# Patient Record
Sex: FEMALE | Race: OTHER | Employment: STUDENT | URBAN - METROPOLITAN AREA
[De-identification: names, ages, dates, MRNs, and addresses within clinical notes are randomized per-mention and may not be internally consistent; named-entity substitution may affect disease eponyms.]

---
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
## 2018-01-07 RX ORDER — AZATHIOPRINE 50 MG/1
TABLET ORAL
Qty: 90 TAB | Refills: 0 | Status: SHIPPED | OUTPATIENT
Start: 2018-01-07 | End: 2018-01-24 | Stop reason: ALTCHOICE

## 2018-01-24 ENCOUNTER — OFFICE VISIT (OUTPATIENT)
Dept: RHEUMATOLOGY | Age: 18
End: 2018-01-24

## 2018-01-24 VITALS
BODY MASS INDEX: 32.83 KG/M2 | HEART RATE: 111 BPM | OXYGEN SATURATION: 98 % | TEMPERATURE: 98.7 F | RESPIRATION RATE: 16 BRPM | DIASTOLIC BLOOD PRESSURE: 92 MMHG | SYSTOLIC BLOOD PRESSURE: 155 MMHG | WEIGHT: 203.4 LBS

## 2018-01-24 DIAGNOSIS — E07.9 THYROID DISEASE: ICD-10-CM

## 2018-01-24 DIAGNOSIS — L93.1 SUBACUTE CUTANEOUS LUPUS ERYTHEMATOSUS: Primary | ICD-10-CM

## 2018-01-24 DIAGNOSIS — I10 HYPERTENSION, UNSPECIFIED TYPE: ICD-10-CM

## 2018-01-24 RX ORDER — HYDROXYCHLOROQUINE SULFATE 200 MG/1
300 TABLET, FILM COATED ORAL DAILY
Qty: 45 TAB | Refills: 6 | Status: SHIPPED | OUTPATIENT
Start: 2018-01-24 | End: 2018-08-08 | Stop reason: SDUPTHER

## 2018-01-24 RX ORDER — AZATHIOPRINE 50 MG/1
150 TABLET ORAL DAILY
Qty: 90 TAB | Refills: 6 | Status: SHIPPED | OUTPATIENT
Start: 2018-01-24 | End: 2018-08-08 | Stop reason: SDUPTHER

## 2018-01-24 NOTE — MR AVS SNAPSHOT
[address] 
[phone] Patient: Moody Castaneda MRN: [id number] IMW:[birth year] Visit Information Date & Time Provider Department Dept. Phone Encounter #  
 1/24/2018  9:00 AM Maya Muir MD [address] [phone] [id number] Follow-up Instructions Return in about 3 months (around 4/24/2018). Upcoming Health Maintenance Date Due Hepatitis A Peds Age 1-18 (1 of 2 - Standard Series) 10/5/1999 DTaP/Tdap/Td series (1 - Tdap) 10/5/2005 HPV AGE 9Y-26Y (1 of 3 - Female 3 Dose Series) 10/5/2009 Influenza Age 5 to Adult 8/1/2017 Allergies as of 1/24/2018  Review Complete On: 1/24/2018 By: Fermin Gonzalez LPN Severity Noted Reaction Type Reactions Benadryl-d Allergy-sinus [Diphenhydramine-phenylephrine]  07/01/2015    Anxiety Contrast Agent [Iodine]  07/01/2015    Other (comments) Stuffy  Nose unable to breath Reglan [Metoclopramide]  07/07/2015    Other (comments) Current Immunizations  Never Reviewed No immunizations on file. Not reviewed this visit You Were Diagnosed With   
  
 Codes Comments Subacute cutaneous lupus erythematosus    -  Primary ICD-10-CM: L93.1 ICD-9-CM: 695.4 Hypertension, unspecified type     ICD-10-CM: I10 
ICD-9-CM: 401.9 Thyroid disease     ICD-10-CM: E07.9 ICD-9-CM: 246. 9 Vitals BP Pulse Temp Resp Weight(growth percentile) LMP  
 (!) 155/92 (>99 %/ >99 %)* (BP 1 Location: Right arm, BP Patient Position: Sitting) (!) 111 98.7 °F (37.1 °C) (Oral) 16 203 lb 6.4 oz (92.3 kg) (98 %, Z= 2.00) 01/03/2018 (Approximate) SpO2 Breastfeeding? BMI OB Status Smoking Status 98% No 32.83 kg/m2 (96 %, Z= 1.78) Pregnant Never Smoker *BP percentiles are based on NHBPEP's 4th Report Growth percentiles are based on CDC 2-20 Years data. BMI and BSA Data  Body Mass Index Body Surface Area  
 32.83 kg/m 2 2.07 m 2 Preferred Pharmacy Pharmacy Name Phone 310 [facility], [facility] [address] ([address] [phone] Your Updated Medication List  
  
   
This list is accurate as of: 1/24/18  9:24 AM.  Always use your most recent med list.  
  
  
  
  
 * PROAIR HFA 90 mcg/actuation inhaler Generic drug:  albuterol Take  by inhalation. * albuterol 2.5 mg /3 mL (0.083 %) nebulizer solution Commonly known as:  PROVENTIL VENTOLIN  
INHALE 1 VIAL VIA NEBULIZER Q 4 TO 6 H PRN  
  
 * azaTHIOprine 50 mg tablet Commonly known as:  The Pepsi Take 50 mg by mouth daily. Indications: takes (3) tablets * azaTHIOprine 50 mg tablet Commonly known as:  The Pepsi Take 3 Tabs by mouth daily for 30 days. ergocalciferol 50,000 unit capsule Commonly known as:  ERGOCALCIFEROL TK ONE C PO  Q WEEK FOR 90 DAYS  
  
 ethosuximide 250 mg/5 mL solution Commonly known as:  Jennie Gambles Take 20 mL by mouth nightly. * hydroxychloroquine 200 mg tablet Commonly known as:  PLAQUENIL Take 200 mg by mouth daily. * hydroxychloroquine 200 mg tablet Commonly known as:  PLAQUENIL Take 1.5 Tabs by mouth daily for 30 days. medroxyPROGESTERone 150 mg/mL injection Commonly known as:  DEPO-PROVERA INJECT 1ML INTRAMUSCULAR Q 3 MONTHS * Notice: This list has 6 medication(s) that are the same as other medications prescribed for you. Read the directions carefully, and ask your doctor or other care provider to review them with you. Prescriptions Sent to Pharmacy Refills  
 azaTHIOprine (IMURAN) 50 mg tablet 6 Sig: Take 3 Tabs by mouth daily for 30 days. Class: Normal  
 [facility] [address] [place] [address] #: [phone]  Route: Oral  
 hydroxychloroquine (PLAQUENIL) 200 mg tablet 6 Sig: Take 1.5 Tabs by mouth daily for 30 days. Class: Normal  
 Pharm[facility] [address] [place] [address] #: [phone] Route: Oral  
  
We Performed the Following C REACTIVE PROTEIN, QT [52459 CPT(R)] CBC+PLATELET+HEM REVIEW [21486 CPT(R)] COMPLEMENT, C3 [id number] CPT(R)] COMPLEMENT, C4 [id number] CPT(R)] CREATININE, UR, RANDOM [id number] CPT(R)] DSDNA (NDNA) SCRN BY JAJA [[id number] Custom] METABOLIC PANEL, COMPREHENSIVE [59406 CPT(R)] Francesca Sheth [[id number] Custom] SED RATE (ESR) [id number] CPT(R)] TSH 3RD GENERATION [20596 CPT(R)] UA/M W/RFLX CULTURE, ROUTINE [[id number] Custom] Follow-up Instructions Return in about 3 months (around 4/24/2018). Introducing [facility]! [employer] introduces STRATUSCORE patient portal. Now you can access parts of your medical record, email your doctor's office, and request medication refills online. 1. In your internet browser, go to [email or web address] 2. Click on the First Time User? Click Here link in the Sign In box. You will see the New Member Sign Up page. 3. Enter your STRATUSCORE Access Code exactly as it appears below. You will not need to use this code after youve completed the sign-up process. If you do not sign up before the expiration date, you must request a new code. · STRATUS[employer] Access Code: [email or web address] Expires: 4/24/2018  9:23 AM 
 
4. Enter the last four digits of your Social Security Number (xxxx) and Date of Birth (mm/dd/yyyy) as indicated and click Submit. You will be taken to the next sign-up page. 5. Create a STRATUSCORE ID. This will be your STRATUSCORE login ID and cannot be changed, so think of one that is secure and easy to remember. 6. Create a STRATUSCORE password. You can change your password at any time. 7. Enter your Password Reset Question and Answer.  This can be used at a later time if you forget your password. 8. Enter your e-mail address. You will receive e-mail notification when new information is available in [address] 9. Click Sign Up. You can now view and download portions of your medical record. 10. Click the Download Summary menu link to download a portable copy of your medical information. If you have questions, please visit the Frequently Asked Questions section of the [employer]inder website. Remember, [employer]inder is NOT to be used for urgent needs. For medical emergencies, dial 911. Now available from your iPhone and Android! Please provide this summary of care documentation to your next provider. Your primary care clinician is listed as Phys Other. If you have any questions after today's visit, please call [phone].

## 2018-01-24 NOTE — PROGRESS NOTES
RHEUMATOLOGY PROBLEM LIST AND CHIEF COMPLAINT  1. 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  female. Today, the patient complains of no pain in the joints. Location: NA  Severity:  0 on a scale of 0-10  Timing: all day   Duration:  4 months  Modifying factors: Recent Birth  Context/Associated signs and symptoms: The patient recently gave birth with no complications. She has no joint complaints and denies rashes or fevers. She is no longer taking prednisone. She continues with Plaquenil 300 mg daily, and Imuran 150 mg daily. She has Depo IM and complains of nausea and weight gain and bleeding. She continues with ethosuxomide and denies any recent seizures. RHEUMATOLOGY REVIEW OF SYSTEMS   Positives as per history  Negatives as follows:  Tania GuadalupeBryanna:  Denies unexplained persistent fevers or weight change  RESPIRATORY:  No pleuritic pain, exertional dyspnea  CARDIOVASCULAR:  Denies chest pain  GASTRO:   Denies heartburn, abdominal pain, nausea, vomiting, diarrhea  SKIN:    Denies rash  MSK:    No morning stiffness >1 hour    PAST MEDICAL HISTORY  Reviewed with patient, significant changes in medical history - No    PHYSICAL EXAM  Blood pressure (!) 155/92, pulse (!) 111, temperature 98.7 °F (37.1 °C), temperature source Oral, resp. rate 16, weight 203 lb 6.4 oz (92.3 kg), last menstrual period 01/03/2018, SpO2 98 %, not currently breastfeeding. GENERAL APPEARANCE: Well-nourished, no acute distress  NECK: No adenopathy  ENT: No oral ulcers  CARDIOVASCULAR: Heart rhythm is regular.  No murmur, rub, gallop  CHEST: Normal vesicular breath sounds. No wheezes, rales, pleural friction rubs  ABDOMINAL: The abdomen is soft and nontender. Bowel sounds are normal.  SKIN: No rash, palpable purpura, digital ulcer, abnormal thickening   MUSCULOSKELETAL: Generalized hypermobility  Upper extremities - full range of motion, no tenderness, no swelling, no synovial thickening and no deformity of joints  Lower extremities - full range of motion, no tenderness, no swelling, no synovial thickening and no deformity of joints     LABS, RADIOLOGY AND PROCEDURES   Previous labs reviewed -Yes    Labs 8/27/2017  Proteinuria - trace    ASSESSMENT  1. Lupus (Established problem -  Progressive disease) - The patient's lupus is well-managed by Imuran 150 mg daily and Plaquenil 300 mg daily. She is asymptomatic on exam. She continues to have high blood pressure so we will continue to monitor this. She should continue with her medication and return in 3 months for a follow up. I will check labs today. 2. Hypermobility - (Established problem -  Stable disease)    3. Drug therapy monitoring for toxicity (azathioprine) - CBC, BUN, Cr, AST, ALT and albumin every 3 months. 4. Drug therapy monitoring for toxicity (plaquenil) - CBC, BUN, Cr, AST, ALT and albumin every 3 months; eye exams every 6-12 months for retinal toxicity. PLAN  1. Imuran 150 mg daily  2. Plaquenil 300 mg daily. 3. Lupus disease activity labs - CBC, CMP, ESR, CRP, DSDNDA, complements, UA, urine protein/creatinine   4. Return in 4 months    Savannah Metz MD  Adult and Pediatric Rheumatology     [facility] [address], Phone [phone], Fax [phone]     Visiting  of Pediatrics    Department of Pediatrics, [facility] of [address] [place] [address], [address] [place] [address], Phone [phone], Fax [phone]    There are no Patient Instructions on file for this visit. cc:  Amrita Page (PCP)  Dr. Cirilo Grove (OBGYN)  Dr. Leonor Stallworth (High-risk OB)    Written by andrey King, as dictated by Joaquin Krueger.  Jah Daley M.D.

## 2018-01-29 LAB
ALBUMIN SERPL-MCNC: 4.3 G/DL (ref 3.5–5.5)
ALBUMIN/GLOB SERPL: 1.4 {RATIO} (ref 1.2–2.2)
ALP SERPL-CCNC: 62 IU/L (ref 39–117)
ALT SERPL-CCNC: 6 IU/L (ref 0–32)
AST SERPL-CCNC: 13 IU/L (ref 0–40)
BASOPHILS # BLD MANUAL: 0 X10E3/UL (ref 0–0.2)
BASOPHILS NFR BLD MANUAL: 0 %
BILIRUB SERPL-MCNC: <0.2 MG/DL (ref 0–1.2)
BUN SERPL-MCNC: 12 MG/DL (ref 6–20)
BUN/CREAT SERPL: 20 (ref 9–23)
C3 SERPL-MCNC: 100 MG/DL (ref 82–167)
C4 SERPL-MCNC: 9 MG/DL (ref 14–44)
CALCIUM SERPL-MCNC: 8.8 MG/DL (ref 8.7–10.2)
CHLORIDE SERPL-SCNC: 105 MMOL/L (ref 96–106)
CO2 SERPL-SCNC: 19 MMOL/L (ref 18–29)
CREAT SERPL-MCNC: 0.61 MG/DL (ref 0.57–1)
CRP SERPL-MCNC: 19.8 MG/L (ref 0–4.9)
DIFFERENTIAL COMMENT, 115260: ABNORMAL
DSDNA (NDNA) SCRN BY CRITHIDIA: NORMAL TITER
EOSINOPHIL # BLD MANUAL: 0.1 X10E3/UL (ref 0–0.4)
EOSINOPHIL NFR BLD MANUAL: 3 %
ERYTHROCYTE [DISTWIDTH] IN BLOOD BY AUTOMATED COUNT: 15.5 % (ref 12.3–15.4)
ERYTHROCYTE [SEDIMENTATION RATE] IN BLOOD BY WESTERGREN METHOD: 22 MM/HR (ref 0–32)
GFR SERPLBLD CREATININE-BSD FMLA CKD-EPI: 132 ML/MIN/1.73
GFR SERPLBLD CREATININE-BSD FMLA CKD-EPI: 152 ML/MIN/1.73
GLOBULIN SER CALC-MCNC: 3.1 G/DL (ref 1.5–4.5)
GLUCOSE SERPL-MCNC: 102 MG/DL (ref 65–99)
HCT VFR BLD AUTO: 32 % (ref 34–46.6)
HGB BLD-MCNC: 10.3 G/DL (ref 11.1–15.9)
LYMPHOCYTES # BLD MANUAL: 1.4 X10E3/UL (ref 0.7–3.1)
LYMPHOCYTES NFR BLD MANUAL: 33 %
MCH RBC QN AUTO: 26.3 PG (ref 26.6–33)
MCHC RBC AUTO-ENTMCNC: 32.2 G/DL (ref 31.5–35.7)
MCV RBC AUTO: 82 FL (ref 79–97)
MONOCYTES # BLD MANUAL: 0.1 X10E3/UL (ref 0.1–0.9)
MONOCYTES NFR BLD MANUAL: 3 %
NEUTROPHILS # BLD MANUAL: 2.6 X10E3/UL (ref 1.4–7)
NEUTROPHILS NFR BLD MANUAL: 61 %
PLATELET # BLD AUTO: 387 X10E3/UL (ref 150–379)
PLATELET BLD QL SMEAR: ABNORMAL
POTASSIUM SERPL-SCNC: 3.9 MMOL/L (ref 3.5–5.2)
PROT SERPL-MCNC: 7.4 G/DL (ref 6–8.5)
RBC # BLD AUTO: 3.92 X10E6/UL (ref 3.77–5.28)
RBC MORPH BLD: ABNORMAL
SODIUM SERPL-SCNC: 141 MMOL/L (ref 134–144)
TSH SERPL DL<=0.005 MIU/L-ACNC: 2.26 UIU/ML (ref 0.45–4.5)
WBC # BLD AUTO: 4.3 X10E3/UL (ref 3.4–10.8)

## 2018-02-01 LAB
CREAT UR-MCNC: NORMAL MG/DL
GLUCOSE UR QL: NORMAL
KETONES UR QL STRIP: NORMAL
PH UR STRIP: NORMAL [PH]
PROT UR QL STRIP: NORMAL
PROT UR-MCNC: NORMAL MG/DL
SP GR UR: NORMAL

## 2018-03-01 DIAGNOSIS — G89.4 CHRONIC PAIN SYNDROME: Primary | ICD-10-CM

## 2018-03-01 DIAGNOSIS — L93.1 SUBACUTE CUTANEOUS LUPUS ERYTHEMATOSUS: ICD-10-CM

## 2018-03-28 ENCOUNTER — OFFICE VISIT (OUTPATIENT)
Dept: RHEUMATOLOGY | Age: 18
End: 2018-03-28

## 2018-03-28 VITALS
WEIGHT: 215.6 LBS | DIASTOLIC BLOOD PRESSURE: 91 MMHG | TEMPERATURE: 98.5 F | HEIGHT: 66 IN | HEART RATE: 111 BPM | BODY MASS INDEX: 34.65 KG/M2 | SYSTOLIC BLOOD PRESSURE: 132 MMHG | OXYGEN SATURATION: 98 % | RESPIRATION RATE: 16 BRPM

## 2018-03-28 DIAGNOSIS — F51.01 PRIMARY INSOMNIA: ICD-10-CM

## 2018-03-28 DIAGNOSIS — J45.50 SEVERE PERSISTENT ASTHMA, UNSPECIFIED WHETHER COMPLICATED: Primary | ICD-10-CM

## 2018-03-28 RX ORDER — PREDNISONE 20 MG/1
TABLET ORAL
Refills: 0 | COMMUNITY
Start: 2018-03-19 | End: 2018-11-05 | Stop reason: ALTCHOICE

## 2018-03-28 NOTE — MR AVS SNAPSHOT
[address] Meghan Hoffman [address] 
[phone] Patient: Moody Castaneda MRN: [id number] XUJ:[birth year] Visit Information Date & Time Provider Department Dept. Phone Encounter #  
 3/28/2018  1:00 PM Maya Muir MD [address] [phone] [id number] Follow-up Instructions Return in about 2 months (around 5/28/2018). Upcoming Health Maintenance Date Due Hepatitis A Peds Age 1-18 (1 of 2 - Standard Series) 10/5/1999 DTaP/Tdap/Td series (1 - Tdap) 10/5/2005 HPV AGE 9Y-26Y (1 of 3 - Female 3 Dose Series) 10/5/2009 Influenza Age 5 to Adult 8/1/2017 Allergies as of 3/28/2018  Review Complete On: 3/28/2018 By: Maya Muir MD  
  
 Severity Noted Reaction Type Reactions Benadryl-d Allergy-sinus [Diphenhydramine-phenylephrine]  07/01/2015    Anxiety Contrast Agent [Iodine]  07/01/2015    Other (comments) Stuffy  Nose unable to breath Reglan [Metoclopramide]  07/07/2015    Other (comments) Current Immunizations  Never Reviewed No immunizations on file. Not reviewed this visit You Were Diagnosed With   
  
 Codes Comments Severe persistent asthma, unspecified whether complicated    -  Primary ICD-10-CM: J45.50 ICD-9-CM: 493.90 Primary insomnia     ICD-10-CM: F51.01 
ICD-9-CM: 307.42 Vitals BP Pulse Temp Resp Height(growth percentile) Weight(growth percentile) (!) 132/91 (98 %/ >99 %)* (BP 1 Location: Left arm, BP Patient Position: Sitting) (!) 111 98.5 °F (36.9 °C) (Oral) 16 5' 6\" (1.676 m) (75 %, Z= 0.67) 215 lb 9.6 oz (97.8 kg) (98 %, Z= 2.15) LMP SpO2 Breastfeeding? BMI OB Status Smoking Status 01/03/2018 (Approximate) 98% No 34.8 kg/m2 (97 %, Z= 1.90) Chemically Induced Never Smoker *BP percentiles are based on NHBPEP's 4th Report Growth percentiles are based on CDC 2-20 Years data. Vitals History BMI and BSA Data Body Mass Index Body Surface Area 34.8 kg/m 2 2.13 m 2 Preferred Pharmacy Pharmacy Name Phone [facility] [address] [phone] Your Updated Medication List  
  
   
This list is accurate as of 3/28/18  1:35 PM.  Always use your most recent med list.  
  
  
  
  
 * PROAIR HFA 90 mcg/actuation inhaler Generic drug:  albuterol Take  by inhalation. * albuterol 2.5 mg /3 mL (0.083 %) nebulizer solution Commonly known as:  PROVENTIL VENTOLIN  
INHALE 1 VIAL VIA NEBULIZER Q 4 TO 6 H PRN  
  
 azaTHIOprine 50 mg tablet Commonly known as:  The Pepsi Take 50 mg by mouth daily. Indications: takes (3) tablets  
  
 ergocalciferol 50,000 unit capsule Commonly known as:  ERGOCALCIFEROL TK ONE C PO  Q WEEK FOR 90 DAYS  
  
 ethosuximide 250 mg/5 mL solution Commonly known as:  Cummings Flake Take 20 mL by mouth nightly. hydroxychloroquine 200 mg tablet Commonly known as:  PLAQUENIL Take 200 mg by mouth daily. medroxyPROGESTERone 150 mg/mL injection Commonly known as:  DEPO-PROVERA INJECT 1ML INTRAMUSCULAR Q 3 MONTHS  
  
 predniSONE 20 mg tablet Commonly known as:  DELTASONE  
  
 * Notice: This list has 2 medication(s) that are the same as other medications prescribed for you. Read the directions carefully, and ask your doctor or other care provider to review them with you. We Performed the Following CREATININE, UR, RANDOM [id number] CPT(R)] Jason Wade [[id number] Custom] REFERRAL TO ALLERGY [REF5 Custom] Comments:  
 Dr. Obed Ceron - [phone] REFERRAL TO SLEEP STUDIES [REF99 Custom] Comments:  
 Sleep Study with Consult UA/M W/RFLX CULTURE, ROUTINE [[id number] Custom] Follow-up Instructions Return in about 2 months (around 5/28/2018). Referral Information Referral ID Referred By Referred To [id number] Sterling Wong Not Available Visits Status Start Date End Date 1 New Request 3/28/18 3/28/19 If your referral has a status of pending review or denied, additional information will be sent to support the outcome of this decision. Referral ID Referred By Referred To  
 [id number] Sterling Wong [address], [place] [address] Phone: [phone] Visits Status Start Date End Date 1 New Request 3/28/18 3/28/19 If your referral has a status of pending review or denied, additional information will be sent to support the outcome of this decision. Introducing [facility]! Dear Jani Almaguer: 
Thank you for requesting a Versus account. Our records indicate that you already have an active Versus account. You can access your account anytime at [email or web address]. [employer] Did you know that you can access your hospital and ER discharge instructions at any time in Versus? You can also review all of your test results from your hospital stay or ER visit. Additional Information If you have questions, please visit the Frequently Asked Questions section of the Versus website at [email or web address] [employer]/. Remember, Versus is NOT to be used for urgent needs. For medical emergencies, dial 911. Now available from your iPhone and Android! Please provide this summary of care documentation to your next provider. Your primary care clinician is listed as Phys Other. If you have any questions after today's visit, please call [phone].

## 2018-03-28 NOTE — PROGRESS NOTES
RHEUMATOLOGY PROBLEM LIST AND CHIEF COMPLAINT  1. 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  female. Today, the patient complains of no pain in the joints. Location: Hands  Severity:  0 on a scale of 0-10  Timing: all day   Duration:  2 months  Context/Associated signs and symptoms: The patient complains of recent intermittent hand swelling, brain fog, fatigue, and forgetfulness. She mentions a recent UTI and was put on antibiotics. She used steroids last week for asthma; she states she uses her rescue inhaler daily for this. She mentions improvement in fatigue with these steroids. She admits to poor sleep. She continues with Imuran 150 mg daily and Plaquenil 300 mg daily. RHEUMATOLOGY REVIEW OF SYSTEMS   Positives as per history  Negatives as follows:  Erum [place]:  Denies unexplained persistent fevers or weight change  RESPIRATORY:  No pleuritic pain, exertional dyspnea  CARDIOVASCULAR:  Denies chest pain  GASTRO:   Denies heartburn, abdominal pain, nausea, vomiting, diarrhea  SKIN:    Denies rash  MSK:    No morning stiffness >1 hour    PAST MEDICAL HISTORY  Reviewed with patient, significant changes in medical history - No    PHYSICAL EXAM  Blood pressure (!) 132/91, pulse (!) 111, temperature 98.5 °F (36.9 °C), temperature source Oral, resp. rate 16, height 5' 6\" (1.676 m), weight 215 lb 9.6 oz (97.8 kg), last menstrual period 01/03/2018, SpO2 98 %, not currently breastfeeding.   GENERAL APPEARANCE: Well-nourished, no acute distress  NECK: No adenopathy  ENT: No oral ulcers  CARDIOVASCULAR: Heart rhythm is regular. No murmur, rub, gallop  CHEST: Normal vesicular breath sounds. No wheezes, rales, pleural friction rubs  ABDOMINAL: The abdomen is soft and nontender. Bowel sounds are normal.  SKIN: No rash, palpable purpura, digital ulcer, abnormal thickening   MUSCULOSKELETAL: Generalized hypermobility  Upper extremities - full range of motion, no tenderness, no swelling, no synovial thickening and no deformity of joints  Lower extremities - full range of motion, no tenderness, no swelling, no synovial thickening and no deformity of joints     LABS, RADIOLOGY AND PROCEDURES   Previous labs reviewed -Yes    Labs 8/27/2017  Proteinuria - trace    ASSESSMENT  1. Lupus (Established problem -  Progressive disease) - the patient has multiple factors that can contribute to brain fog and fatigue so it is difficulty to determine if her lupus is the cause. We will rule out other causes before we escalate treatment. I will refer her to Allergy/Immunology for better management of her Asthma. I will order a sleep study. We will monitor her urine due to her recent UTI. For now, she should continue with Imuran 150 mg daily and Plaquenil 300 mg daily. She should return in 2 months for a follow up. 2. Hypermobility - (Established problem -  Stable disease)    3. Drug therapy monitoring for toxicity (azathioprine) - CBC, BUN, Cr, AST, ALT and albumin every 3 months. 4. Drug therapy monitoring for toxicity (plaquenil) - CBC, BUN, Cr, AST, ALT and albumin every 3 months; eye exams every 6-12 months for retinal toxicity. PLAN  1. Imuran 150 mg daily  2. Plaquenil 300 mg daily. 3. Check urine for protein and blood  4. Sleep Study  5. Referral to Allergy/Immunology - Zeus Madden  5. Return in 2 months    Savannah Vallejo MD  Adult and Pediatric Rheumatology     [address] [place] [address], [address] [place] [address], Phone [phone], Fax [phone]     Visiting Assistant Professor of Pediatrics    Department of Pediatrics, [employer] [facility] of [address] [place] [address], [address] [place] [address], Phone [phone], Fax [phone]    There are no Patient Instructions on file for this visit. cc:  Prakash Clifton (PCP)  Dr. Bhumi Moran (OBGYN)  Dr. Cathy Byers (High-risk OB)    Written by andrey Quarles, as dictated by Tacho Morris. Manoj Brooks M.D. Total face-to face time was 40 minutes, greater than 50% of which was spent in counseling and coordination of care. The diagnosis, treatment and various other items were discussed in detail: Test results, medication options, possible side effects, lifestyle changes.

## 2018-03-31 LAB
APPEARANCE UR: CLEAR
BACTERIA #/AREA URNS HPF: ABNORMAL /[HPF]
BACTERIA UR CULT: NORMAL
BILIRUB UR QL STRIP: NEGATIVE
CASTS URNS MICRO: ABNORMAL
CASTS URNS QL MICRO: PRESENT /LPF
COLOR UR: YELLOW
CREAT UR-MCNC: 93.2 MG/DL
CRYSTALS URNS MICRO: ABNORMAL
EPI CELLS #/AREA URNS HPF: ABNORMAL /HPF
GLUCOSE UR QL: NEGATIVE
HGB UR QL STRIP: NEGATIVE
KETONES UR QL STRIP: NEGATIVE
LEUKOCYTE ESTERASE UR QL STRIP: ABNORMAL
MICRO URNS: ABNORMAL
MUCOUS THREADS URNS QL MICRO: PRESENT
NITRITE UR QL STRIP: NEGATIVE
PH UR STRIP: 5.5 [PH] (ref 5–7.5)
PROT UR QL STRIP: ABNORMAL
PROT UR-MCNC: 38.2 MG/DL
RBC #/AREA URNS HPF: ABNORMAL /HPF
SP GR UR: 1.02 (ref 1–1.03)
UNIDENT CRYS URNS QL MICRO: PRESENT
URINALYSIS REFLEX, 377202: ABNORMAL
UROBILINOGEN UR STRIP-MCNC: 0.2 MG/DL (ref 0.2–1)
WBC #/AREA URNS HPF: ABNORMAL /HPF

## 2018-06-12 ENCOUNTER — TELEPHONE (OUTPATIENT)
Dept: RHEUMATOLOGY | Age: 18
End: 2018-06-12

## 2018-06-12 NOTE — TELEPHONE ENCOUNTER
----- Message from Sonja Johnson sent at 6/11/2018  3:07 PM EDT -----  Regarding: Dr. Gallo Andre / Telephone  Pt stated she did not receive any information regarding a lung doctor. Pt would like a phone call back with this information. Pt would also like to reschedule her missed appt from May 29, 2018.    Best contact: [phone]

## 2018-07-03 ENCOUNTER — OFFICE VISIT (OUTPATIENT)
Dept: RHEUMATOLOGY | Age: 18
End: 2018-07-03

## 2018-07-03 VITALS
HEART RATE: 103 BPM | DIASTOLIC BLOOD PRESSURE: 85 MMHG | RESPIRATION RATE: 16 BRPM | WEIGHT: 223.4 LBS | TEMPERATURE: 98.9 F | SYSTOLIC BLOOD PRESSURE: 128 MMHG | BODY MASS INDEX: 36.06 KG/M2 | OXYGEN SATURATION: 99 %

## 2018-07-03 DIAGNOSIS — L93.1 SUBACUTE CUTANEOUS LUPUS ERYTHEMATOSUS: Primary | ICD-10-CM

## 2018-07-03 RX ORDER — BECLOMETHASONE DIPROPIONATE HFA 40 UG/1
AEROSOL, METERED RESPIRATORY (INHALATION)
Refills: 5 | COMMUNITY
Start: 2018-06-12

## 2018-07-03 NOTE — PROGRESS NOTES
RHEUMATOLOGY PROBLEM LIST AND CHIEF COMPLAINT  1. Lupus (2015) - Polyarthritis of the small joints of the hands, knees, and feet, remote history of joint pain for last 3 years, pleurisy, high positive RIDGE, elevated ESR, CRP (19.6), dsDNA (38), histone DNA (10.4), Remission 10/2016 - Flare (3/2017 - 10 weeks into pregnancy)    Therapy History[de-identified]  Current DMARDs: Imuran (10/2015-01/2016, restarted 02/2016-current), Plaquenil (restarted 02/2016-current)    INTERVAL HISTORY   This is a 21 y.o.  female. Today, the patient complains of no pain in the joints. Location: Hands  Severity:  0 on a scale of 0-10  Timing: all day   Duration:  2 months  Context/Associated signs and symptoms: The patient's fatigue has improved, but persists daily. She has not had flares since her last visit. She states she has not completed her study or visited Allergy/Immunology. She continues with Imuran 150 mg daily and Plaquenil 300 mg daily. RHEUMATOLOGY REVIEW OF SYSTEMS   Positives as per history  Negatives as follows:  Stacyelodenisha Lever:  Denies unexplained persistent fevers or weight change  RESPIRATORY:  No pleuritic pain, exertional dyspnea  CARDIOVASCULAR:  Denies chest pain  GASTRO:   Denies heartburn, abdominal pain, nausea, vomiting, diarrhea  SKIN:    Denies rash  MSK:    No morning stiffness >1 hour    PAST MEDICAL HISTORY  Reviewed with patient, significant changes in medical history - No    PHYSICAL EXAM  There were no vitals taken for this visit. GENERAL APPEARANCE: Well-nourished, no acute distress  NECK: No adenopathy  ENT: No oral ulcers  CARDIOVASCULAR: Heart rhythm is regular. No murmur, rub, gallop  CHEST: Normal vesicular breath sounds. No wheezes, rales, pleural friction rubs  ABDOMINAL: The abdomen is soft and nontender.  Bowel sounds are normal.  SKIN: No rash, palpable purpura, digital ulcer, abnormal thickening   MUSCULOSKELETAL: Generalized hypermobility  Upper extremities - full range of motion, no tenderness, no swelling, no synovial thickening and no deformity of joints  Lower extremities - full range of motion, no tenderness, no swelling, no synovial thickening and no deformity of joints     LABS, RADIOLOGY AND PROCEDURES   Previous labs reviewed -Yes    Labs 8/27/2017  Proteinuria - trace    ASSESSMENT  1. Lupus (Established problem -  Progressive disease) - We ordered workups for a sleep study and allergy, but the patient has not completed these appointments. I will refer her to allergy again. For now, she should continue with Imuran 150 mg daily and Plaquenil 300 mg daily. She should return in 2 months for a follow up. 2. Hypermobility - (Established problem -  Stable disease)    3. Drug therapy monitoring for toxicity (azathioprine) - CBC, BUN, Cr, AST, ALT and albumin every 3 months. 4. Drug therapy monitoring for toxicity (plaquenil) - CBC, BUN, Cr, AST, ALT and albumin every 3 months; eye exams every 6-12 months for retinal toxicity. PLAN  1. Imuran 150 mg daily  2. Plaquenil 300 mg daily. 3. Check CBC, CMP, markers of inflammation (ESR, CRP), C3, C4,   4. Check DNA AB, DSDNA, IgG's, Vit D panel  3. Check urine for protein and blood  4. Sleep Study  5. Referral Dr. Mindy Finch (Allergy/Immunology)  5. Return in 2 months    Savannah Gonzalez MD  Adult and Pediatric Rheumatology     [facility], [address] [place] [address], Phone [phone], Fax [phone]     Visiting  of Pediatrics    Department of Pediatrics, [facility] of [address] [place] [address], [address] [place] [address], Phone [phone], Fax [phone]    There are no Patient Instructions on file for this visit. cc:  Priscilla Torres (PCP)  Dr. Essence Gupta (OBGYN)  Dr. Pieter Ashley (High-risk OB)    Written by andrey Shaffer, as dictated by Yusuf Reed. Radha Gonzalez M.D.       Total face-to face time was 40 minutes, greater than 50% of which was spent in counseling and coordination of care. The diagnosis, treatment and various other items were discussed in detail: Test results, medication options, possible side effects, lifestyle changes.

## 2018-07-03 NOTE — MR AVS SNAPSHOT
[address] [facility] [address] [place] [address] 
[phone] Patient: Iliana Arizmendi MRN: [id number] NEJ:[birth year] Visit Information Date & Time Provider Department Dept. Phone Encounter #  
 7/3/2018  2:20 PM Katelyn Bettencourt MD Via [employer] [id number] Follow-up Instructions Return in about 4 months (around 11/3/2018). Your Appointments 7/24/2018  2:40 PM  
New Patient with Mervat Post MD  
[address] ([address]) Appt Note: NP; refd by Dr. Alec Quintero, not able to fall asleep_[facility] [address] [facility] [address] [phone] [address] [facility] [phone] Upcoming Health Maintenance Date Due Hepatitis A Peds Age 1-18 (1 of 2 - Standard Series) 10/5/1999 DTaP/Tdap/Td series (1 - Tdap) 10/5/2005 HPV Age 9Y-34Y (1 of 3 - Female 3 Dose Series) 10/5/2009 Pneumococcal 19-64 Medium Risk (1 of 1 - PPSV23) 10/5/2017 Influenza Age 5 to Adult 8/1/2018 Allergies as of 7/3/2018  Review Complete On: 7/3/2018 By: Poonam Jin LPN Severity Noted Reaction Type Reactions Benadryl-d Allergy-sinus [Diphenhydramine-phenylephrine]  07/01/2015    Anxiety Contrast Agent [Iodine]  07/01/2015    Other (comments) Stuffy  Nose unable to breath Reglan [Metoclopramide]  07/07/2015    Other (comments) Current Immunizations  Never Reviewed No immunizations on file. Not reviewed this visit You Were Diagnosed With   
  
 Codes Comments Subacute cutaneous lupus erythematosus    -  Primary ICD-10-CM: L93.1 ICD-9-CM: 695.4 Vitals BP Pulse Temp Resp Weight(growth percentile) 128/85 (BP 1 Location: Left arm, BP Patient Position: Sitting) (!) 103 98.9 °F (37.2 °C) (Oral) 16 223 lb 6.4 oz (101.3 kg) (99 %, Z= 2.25)*  SpO2 BMI OB Status Smoking Status 99% 36.06 kg/m2 (97 %, Z= 1.96)* Chemically Induced Never Smoker *Growth percentiles are based on CDC 2-20 Years data. BMI and BSA Data Body Mass Index Body Surface Area 36.06 kg/m 2 2.17 m 2 Preferred Pharmacy Pharmacy Name Phone [facility] [address] [facility] [facility] [phone] Your Updated Medication List  
  
   
This list is accurate as of 7/3/18  2:58 PM.  Always use your most recent med list.  
  
  
  
  
 * PROAIR HFA 90 mcg/actuation inhaler Generic drug:  albuterol Take  by inhalation. * albuterol 2.5 mg /3 mL (0.083 %) nebulizer solution Commonly known as:  PROVENTIL VENTOLIN  
INHALE 1 VIAL VIA NEBULIZER Q 4 TO 6 H PRN  
  
 azaTHIOprine 50 mg tablet Commonly known as:  The Pepsi Take 50 mg by mouth daily. Indications: takes (3) tablets  
  
 ergocalciferol 50,000 unit capsule Commonly known as:  ERGOCALCIFEROL TK ONE C PO  Q WEEK FOR 90 DAYS  
  
 ethosuximide 250 mg/5 mL solution Commonly known as:  Shelba Poag Take 20 mL by mouth nightly. hydroxychloroquine 200 mg tablet Commonly known as:  PLAQUENIL Take 200 mg by mouth daily. medroxyPROGESTERone 150 mg/mL injection Commonly known as:  DEPO-PROVERA INJECT 1ML INTRAMUSCULAR Q 3 MONTHS  
  
 predniSONE 20 mg tablet Commonly known as:  DELTASONE  
  
 QVAR REDIHALER 40 mcg/actuation Hfab inhaler Generic drug:  beclomethasone dipropionate INHALE 2 PUFFS PO Q 12 H  
  
 * Notice: This list has 2 medication(s) that are the same as other medications prescribed for you. Read the directions carefully, and ask your doctor or other care provider to review them with you. We Performed the Following C REACTIVE PROTEIN, QT [83647 CPT(R)] CBC+PLATELET+HEM REVIEW [12762 CPT(R)] COMPLEMENT, C3 [id number] CPT(R)] COMPLEMENT, C4 [id number] CPT(R)]  DNA AB, DOUBLE STRANDED, IGG [94938 CPT(R)] METABOLIC PANEL, COMPREHENSIVE [65166 CPT(R)] SED RATE (ESR) [id number] CPT(R)] UA/M W/RFLX CULTURE, ROUTINE [[id number] Custom] VITAMIN D, 25 HYROXY PANEL [[id number] Custom] Follow-up Instructions Return in about 4 months (around 11/3/2018). Introducing [facility]! Dear Danelle Canas: 
Thank you for requesting a [email or web address] account. Our records indicate that you already have an active [email or web address] account. You can access your account anytime at [email or web address] Did you know that you can access your hospital and ER discharge instructions at any time in [email or web address]? You can also review all of your test results from your hospital stay or ER visit. Additional Information If you have questions, please visit the Frequently Asked Questions section of the [email or web address] website at [email or web address] Remember, [email or web address] is NOT to be used for urgent needs. For medical emergencies, dial 911. Now available from your iPhone and Android! Please provide this summary of care documentation to your next provider. Your primary care clinician is listed as Phys Other. If you have any questions after today's visit, please call [phone].

## 2018-07-05 LAB
APPEARANCE UR: CLEAR
BACTERIA #/AREA URNS HPF: ABNORMAL /[HPF]
BACTERIA UR CULT: ABNORMAL
BACTERIA UR CULT: ABNORMAL
BILIRUB UR QL STRIP: NEGATIVE
CASTS URNS QL MICRO: ABNORMAL /LPF
COLOR UR: YELLOW
EPI CELLS #/AREA URNS HPF: >10 /HPF
GLUCOSE UR QL: NEGATIVE
HGB UR QL STRIP: ABNORMAL
KETONES UR QL STRIP: NEGATIVE
LEUKOCYTE ESTERASE UR QL STRIP: ABNORMAL
MICRO URNS: ABNORMAL
MUCOUS THREADS URNS QL MICRO: PRESENT
NITRITE UR QL STRIP: NEGATIVE
PH UR STRIP: 5 [PH] (ref 5–7.5)
PROT UR QL STRIP: ABNORMAL
RBC #/AREA URNS HPF: ABNORMAL /HPF
SP GR UR: 1.02 (ref 1–1.03)
URINALYSIS REFLEX, 377202: ABNORMAL
UROBILINOGEN UR STRIP-MCNC: 0.2 MG/DL (ref 0.2–1)
WBC #/AREA URNS HPF: ABNORMAL /HPF

## 2018-07-08 LAB
25(OH)D2 SERPL-MCNC: 2.7 NG/ML
25(OH)D3 SERPL-MCNC: 12 NG/ML
25(OH)D3+25(OH)D2 SERPL-MCNC: 15 NG/ML
ALBUMIN SERPL-MCNC: 4.3 G/DL (ref 3.5–5.5)
ALBUMIN/GLOB SERPL: 1.4 {RATIO} (ref 1.2–2.2)
ALP SERPL-CCNC: 75 IU/L (ref 39–117)
ALT SERPL-CCNC: 8 IU/L (ref 0–32)
AST SERPL-CCNC: 13 IU/L (ref 0–40)
BASOPHILS # BLD MANUAL: 0 X10E3/UL (ref 0–0.2)
BASOPHILS NFR BLD MANUAL: 0 %
BILIRUB SERPL-MCNC: 0.2 MG/DL (ref 0–1.2)
BUN SERPL-MCNC: 11 MG/DL (ref 6–20)
BUN/CREAT SERPL: 17 (ref 9–23)
C3 SERPL-MCNC: 99 MG/DL (ref 82–167)
C4 SERPL-MCNC: 8 MG/DL (ref 14–44)
CALCIUM SERPL-MCNC: 9.2 MG/DL (ref 8.7–10.2)
CHLORIDE SERPL-SCNC: 104 MMOL/L (ref 96–106)
CO2 SERPL-SCNC: 21 MMOL/L (ref 20–29)
CREAT SERPL-MCNC: 0.64 MG/DL (ref 0.57–1)
CRP SERPL-MCNC: 18.4 MG/L (ref 0–4.9)
DIFFERENTIAL COMMENT, 115260: ABNORMAL
DSDNA AB SER-ACNC: 7 IU/ML (ref 0–9)
EOSINOPHIL # BLD MANUAL: 0 X10E3/UL (ref 0–0.4)
EOSINOPHIL NFR BLD MANUAL: 1 %
ERYTHROCYTE [DISTWIDTH] IN BLOOD BY AUTOMATED COUNT: 16.8 % (ref 12.3–15.4)
ERYTHROCYTE [SEDIMENTATION RATE] IN BLOOD BY WESTERGREN METHOD: 15 MM/HR (ref 0–32)
GLOBULIN SER CALC-MCNC: 3.1 G/DL (ref 1.5–4.5)
GLUCOSE SERPL-MCNC: 93 MG/DL (ref 65–99)
HCT VFR BLD AUTO: 34.8 % (ref 34–46.6)
HGB BLD-MCNC: 11.1 G/DL (ref 11.1–15.9)
LYMPHOCYTES # BLD MANUAL: 1.6 X10E3/UL (ref 0.7–3.1)
LYMPHOCYTES NFR BLD MANUAL: 32 %
MCH RBC QN AUTO: 25.1 PG (ref 26.6–33)
MCHC RBC AUTO-ENTMCNC: 31.9 G/DL (ref 31.5–35.7)
MCV RBC AUTO: 79 FL (ref 79–97)
MONOCYTES # BLD MANUAL: 0.1 X10E3/UL (ref 0.1–0.9)
MONOCYTES NFR BLD MANUAL: 3 %
NEUTROPHILS # BLD MANUAL: 3.1 X10E3/UL (ref 1.4–7)
NEUTROPHILS NFR BLD MANUAL: 64 %
PLATELET # BLD AUTO: 363 X10E3/UL (ref 150–379)
PLATELET BLD QL SMEAR: ADEQUATE
POTASSIUM SERPL-SCNC: 4.1 MMOL/L (ref 3.5–5.2)
PROT SERPL-MCNC: 7.4 G/DL (ref 6–8.5)
RBC # BLD AUTO: 4.43 X10E6/UL (ref 3.77–5.28)
RBC MORPH BLD: ABNORMAL
SODIUM SERPL-SCNC: 140 MMOL/L (ref 134–144)
WBC # BLD AUTO: 4.9 X10E3/UL (ref 3.4–10.8)

## 2018-07-16 ENCOUNTER — ED HISTORICAL/CONVERTED ENCOUNTER (OUTPATIENT)
Dept: OTHER | Age: 18
End: 2018-07-16

## 2018-09-05 ENCOUNTER — ED HISTORICAL/CONVERTED ENCOUNTER (OUTPATIENT)
Dept: OTHER | Age: 18
End: 2018-09-05

## 2018-09-11 ENCOUNTER — TELEPHONE (OUTPATIENT)
Dept: RHEUMATOLOGY | Age: 18
End: 2018-09-11

## 2018-09-11 NOTE — TELEPHONE ENCOUNTER
----- Message from [employer] Delia sent at 9/11/2018  8:51 AM EDT -----  Regarding: Dr. Peralta Shaper: [phone]  Pt is checking on the status of a refill request \"Azathioprine 50mg\" And \"Hydroxychloroquine 200mg\" requested Friday to be sent to [facility] on [facility], Pharm contact: [phone]. Pt would like a call back when its filled if possible.

## 2018-09-11 NOTE — TELEPHONE ENCOUNTER
Spoke to pt informed pt that her scripts were ready for pickup, pt verbally acknowledged understanding

## 2018-09-20 ENCOUNTER — TELEPHONE (OUTPATIENT)
Dept: RHEUMATOLOGY | Age: 18
End: 2018-09-20

## 2018-09-20 NOTE — TELEPHONE ENCOUNTER
----- Message from Horace Abrams sent at 9/20/2018 11:13 AM EDT -----  Regarding: Dr. Anita Diaz: [phone]  Pt is requesting a call back in regards to feeling tired lately.

## 2018-09-21 NOTE — TELEPHONE ENCOUNTER
Spoke to pt, pt stated that she is severely fatigued and is there anything she can take to give her energy, pt was informed that her message will be relayed to dr Roselia Vincent, pt verbally acknowledged understanding

## 2018-11-05 ENCOUNTER — OFFICE VISIT (OUTPATIENT)
Dept: RHEUMATOLOGY | Age: 18
End: 2018-11-05

## 2018-11-05 VITALS
SYSTOLIC BLOOD PRESSURE: 148 MMHG | WEIGHT: 236.4 LBS | BODY MASS INDEX: 38.16 KG/M2 | DIASTOLIC BLOOD PRESSURE: 90 MMHG | TEMPERATURE: 98.6 F | RESPIRATION RATE: 16 BRPM | HEART RATE: 82 BPM | OXYGEN SATURATION: 100 %

## 2018-11-05 DIAGNOSIS — F51.01 PRIMARY INSOMNIA: ICD-10-CM

## 2018-11-05 DIAGNOSIS — M25.50 HYPERMOBILITY ARTHRALGIA: ICD-10-CM

## 2018-11-05 DIAGNOSIS — L93.1 SUBACUTE CUTANEOUS LUPUS ERYTHEMATOSUS: Primary | ICD-10-CM

## 2018-11-05 DIAGNOSIS — M32.9 SYSTEMIC LUPUS ERYTHEMATOSUS, UNSPECIFIED SLE TYPE, UNSPECIFIED ORGAN INVOLVEMENT STATUS (HCC): ICD-10-CM

## 2018-11-05 PROBLEM — E66.01 SEVERE OBESITY (HCC): Status: ACTIVE | Noted: 2018-11-05

## 2018-11-05 RX ORDER — ERGOCALCIFEROL 1.25 MG/1
50000 CAPSULE ORAL
Qty: 8 CAP | Refills: 0 | Status: SHIPPED | OUTPATIENT
Start: 2018-11-05 | End: 2018-12-25

## 2018-11-05 NOTE — PROGRESS NOTES
RHEUMATOLOGY PROBLEM LIST AND CHIEF COMPLAINT 1. Lupus (2015) - Polyarthritis of the small joints of the hands, knees, and feet, remote history of joint pain for last 3 years, pleurisy, high positive RIDGE, elevated ESR, CRP (19.6), dsDNA (38), histone DNA (10.4), Remission 10/2016 - Flare (3/2017 - 10 weeks into pregnancy) Therapy History[de-identified] 
Current DMARDs: Imuran (10/2015-01/2016, restarted 02/2016-current), Plaquenil (restarted 02/2016-current) INTERVAL HISTORY This is a 19 y.o.  female. Today, the patient complains of pain in the joints. Location: ankle Severity:  4 on a scale of 0-10 Timing: all day Duration:  4 months Context/Associated signs and symptoms: The patient continues to complain of fatigue and poor sleep (5-7 hours daily). She did not get her sleep study completed. She complains of some ankle and knee swelling that worsens with activity. She continues with imuran 150 mg daily and Plaquenil 300 mg daily. She is not currently taking her birth control. We discussed alternatives to pills such as IUD. RHEUMATOLOGY REVIEW OF SYSTEMS Positives as per history Negatives as follows: 
CONSTITUTlONAL:  Denies unexplained persistent fevers or weight change RESPIRATORY:  No pleuritic pain, exertional dyspnea CARDIOVASCULAR:  Denies chest pain GASTRO:   Denies heartburn, abdominal pain, nausea, vomiting, diarrhea MSK:    No morning stiffness >1 hour HEMATOLOGIC:  No easy bruising, purpura, swollen lymph nodes SKIN:    Denies alopecia, ulcers, nodules, sun sensitivity, unexplained persistent rash VASCULAR:   Denies edema, cyanosis, raynaud phenomenon NEUROLOGIC:  Denies specific muscle weakness, paresthesias PSYCHIATRIC:  No snoring, depression, anxiety PAST MEDICAL HISTORY Reviewed with patient, significant changes in medical history - No 
 
PHYSICAL EXAM 
Blood pressure 148/90, pulse 82, temperature 98.6 °F (37 °C), temperature source Oral, resp.  rate 16, weight 236 lb 6.4 oz (107.2 kg), last menstrual period 10/25/2018, SpO2 100 %. GENERAL APPEARANCE: Well-nourished, no acute distress EYES: No scleral erythema, conjunctival injection ENT: No oral ulcer, parotid enlargement NECK: No adenopathy, thyroid enlargement CARDIOVASCULAR: Heart rhythm is regular. No murmur, rub, gallop CHEST: Normal vesicular breath sounds. No wheezes, rales, pleural friction rubs ABDOMINAL: The abdomen is soft and nontender. Bowel sounds are normal 
EXTREMITIES: There is no evidence of clubbing, cyanosis, edema SKIN: No rash, palpable purpura, digital ulcer, abnormal thickening, normal nailfold capillaries NEUROLOGICAL: Normal gait and station, full strength in upper and lower extremities,  normal sensation to light touch MUSCULOSKELETAL: Generalized hypermobility Upper extremities - full range of motion, no tenderness, no swelling, no synovial thickening and no deformity of joints Lower extremities - full range of motion, no tenderness, no swelling, no synovial thickening and no deformity of joints LABS, RADIOLOGY AND PROCEDURES Previous labs reviewed -Yes Labs 8/27/2017 Proteinuria - trace ASSESSMENT 1. Lupus (Established problem -  Stable disease) - The patient has yet to complete workups for sleep study. She should continue with Imuran 150 mg daily and Plaquenil 300 mg daily. I will check labs today. Return in 2 months for a follow up. 2. Hypermobility (Established problem -  Stable disease) - Her ankle and knee swelling is most likely secondary to hypermobility, worsening with activity. ROM exercises recommended 3. Drug therapy monitoring for toxicity (azathioprine) - CBC, BUN, Cr, AST, ALT and albumin every 3 months. 4. Drug therapy monitoring for toxicity (plaquenil) - CBC, BUN, Cr, AST, ALT and albumin every 3 months; eye exams every 6-12 months for retinal toxicity. PLAN 
1. Imuran 150 mg daily 2. Plaquenil 300 mg daily.  
3. Check CBC, CMP, markers of inflammation (ESR, CRP), complements, DSDNA, IGG's 
4. Check urine for protein and blood 5. Return in 2 months Savannah Cho MD 
Adult and Pediatric Rheumatology [address] [place] [address] [id number] [place] [address], Phone [phone], Fax [phone] Visiting  of Pediatrics Department of Pediatrics, [address], Phone [phone], Fax [phone] There are no Patient Instructions on file for this visit. cc: Derek Wood (PCP) Dr. Chinedu Lindsay (OBGYN) Dr. Jae Fernandez (High-risk OB) Written by andery Gill, as dictated by Nova Stephenson.  Shanique Cho M.D.

## 2018-11-07 LAB
ALBUMIN SERPL-MCNC: 4.3 G/DL (ref 3.5–5.5)
ALBUMIN/GLOB SERPL: 1.4 {RATIO} (ref 1.2–2.2)
ALP SERPL-CCNC: 66 IU/L (ref 39–117)
ALT SERPL-CCNC: 5 IU/L (ref 0–32)
APPEARANCE UR: ABNORMAL
AST SERPL-CCNC: 13 IU/L (ref 0–40)
BACTERIA #/AREA URNS HPF: ABNORMAL /[HPF]
BASOPHILS # BLD MANUAL: 0 X10E3/UL (ref 0–0.2)
BASOPHILS NFR BLD MANUAL: 0 %
BILIRUB SERPL-MCNC: <0.2 MG/DL (ref 0–1.2)
BILIRUB UR QL STRIP: NEGATIVE
BUN SERPL-MCNC: 13 MG/DL (ref 6–20)
BUN/CREAT SERPL: 26 (ref 9–23)
C3 SERPL-MCNC: 95 MG/DL (ref 82–167)
C4 SERPL-MCNC: 7 MG/DL (ref 14–44)
CALCIUM SERPL-MCNC: 9.2 MG/DL (ref 8.7–10.2)
CASTS URNS QL MICRO: ABNORMAL /LPF
CHLORIDE SERPL-SCNC: 103 MMOL/L (ref 96–106)
CO2 SERPL-SCNC: 22 MMOL/L (ref 20–29)
COLOR UR: YELLOW
CREAT SERPL-MCNC: 0.5 MG/DL (ref 0.57–1)
CRP SERPL-MCNC: 22.7 MG/L (ref 0–4.9)
DIFFERENTIAL COMMENT, 115260: ABNORMAL
DSDNA AB SER-ACNC: 7 IU/ML (ref 0–9)
EOSINOPHIL # BLD MANUAL: 0.1 X10E3/UL (ref 0–0.4)
EOSINOPHIL NFR BLD MANUAL: 2 %
EPI CELLS #/AREA URNS HPF: >10 /HPF
ERYTHROCYTE [DISTWIDTH] IN BLOOD BY AUTOMATED COUNT: 16.9 % (ref 12.3–15.4)
ERYTHROCYTE [SEDIMENTATION RATE] IN BLOOD BY WESTERGREN METHOD: 16 MM/HR (ref 0–32)
GLOBULIN SER CALC-MCNC: 3 G/DL (ref 1.5–4.5)
GLUCOSE SERPL-MCNC: 87 MG/DL (ref 65–99)
GLUCOSE UR QL: NEGATIVE
HCT VFR BLD AUTO: 34.4 % (ref 34–46.6)
HGB BLD-MCNC: 10.7 G/DL (ref 11.1–15.9)
HGB UR QL STRIP: ABNORMAL
KETONES UR QL STRIP: NEGATIVE
LEUKOCYTE ESTERASE UR QL STRIP: NEGATIVE
LYMPHOCYTES # BLD MANUAL: 1.9 X10E3/UL (ref 0.7–3.1)
LYMPHOCYTES NFR BLD MANUAL: 29 %
MCH RBC QN AUTO: 24.7 PG (ref 26.6–33)
MCHC RBC AUTO-ENTMCNC: 31.1 G/DL (ref 31.5–35.7)
MCV RBC AUTO: 79 FL (ref 79–97)
MICRO URNS: ABNORMAL
MONOCYTES # BLD MANUAL: 0.2 X10E3/UL (ref 0.1–0.9)
MONOCYTES NFR BLD MANUAL: 3 %
MUCOUS THREADS URNS QL MICRO: PRESENT
NEUTROPHILS # BLD MANUAL: 4.3 X10E3/UL (ref 1.4–7)
NEUTROPHILS NFR BLD MANUAL: 66 %
NITRITE UR QL STRIP: NEGATIVE
PH UR STRIP: 6.5 [PH] (ref 5–7.5)
PLATELET # BLD AUTO: 359 X10E3/UL (ref 150–379)
PLATELET BLD QL SMEAR: ADEQUATE
POTASSIUM SERPL-SCNC: 4.2 MMOL/L (ref 3.5–5.2)
PROT SERPL-MCNC: 7.3 G/DL (ref 6–8.5)
PROT UR QL STRIP: ABNORMAL
RBC # BLD AUTO: 4.34 X10E6/UL (ref 3.77–5.28)
RBC #/AREA URNS HPF: ABNORMAL /HPF
RBC MORPH BLD: ABNORMAL
SODIUM SERPL-SCNC: 138 MMOL/L (ref 134–144)
SP GR UR: 1.03 (ref 1–1.03)
URINALYSIS REFLEX, 377202: ABNORMAL
UROBILINOGEN UR STRIP-MCNC: 0.2 MG/DL (ref 0.2–1)
WBC # BLD AUTO: 6.5 X10E3/UL (ref 3.4–10.8)
WBC #/AREA URNS HPF: ABNORMAL /HPF

## 2018-11-11 ENCOUNTER — ED HISTORICAL/CONVERTED ENCOUNTER (OUTPATIENT)
Dept: OTHER | Age: 18
End: 2018-11-11

## 2018-11-12 RX ORDER — ETHOSUXIMIDE 250 MG/5ML
SOLUTION ORAL
Qty: 600 ML | Refills: 0 | Status: SHIPPED | OUTPATIENT
Start: 2018-11-12 | End: 2018-12-07 | Stop reason: SDUPTHER

## 2018-12-08 ENCOUNTER — ED HISTORICAL/CONVERTED ENCOUNTER (OUTPATIENT)
Dept: OTHER | Age: 18
End: 2018-12-08

## 2018-12-10 RX ORDER — ETHOSUXIMIDE 250 MG/5ML
SOLUTION ORAL
Qty: 600 ML | Refills: 0 | Status: SHIPPED | OUTPATIENT
Start: 2018-12-10 | End: 2018-12-10

## 2019-01-02 ENCOUNTER — OFFICE VISIT (OUTPATIENT)
Dept: NEUROLOGY | Age: 19
End: 2019-01-02

## 2019-01-02 VITALS
SYSTOLIC BLOOD PRESSURE: 140 MMHG | DIASTOLIC BLOOD PRESSURE: 80 MMHG | HEIGHT: 66 IN | WEIGHT: 141 LBS | RESPIRATION RATE: 16 BRPM | BODY MASS INDEX: 22.66 KG/M2 | HEART RATE: 70 BPM | OXYGEN SATURATION: 98 %

## 2019-01-02 DIAGNOSIS — G40.A09 NONINTRACTABLE ABSENCE EPILEPSY WITHOUT STATUS EPILEPTICUS (HCC): Primary | ICD-10-CM

## 2019-01-02 RX ORDER — ETHOSUXIMIDE 250 MG/5ML
SOLUTION ORAL
Qty: 600 ML | Refills: 11 | Status: SHIPPED | OUTPATIENT
Start: 2019-01-02

## 2019-01-02 NOTE — PROGRESS NOTES
Pt here for follow up on her seizures. No seizures since last visit. Depression screen completed. Recent miscarriage.

## 2019-01-02 NOTE — PROGRESS NOTES
Chief Complaint Patient presents with  Seizure HPI 
 
12-year-old woman here to follow-up. She has epilepsy managed with Zarontin. Last seizure was more than 2 years ago. No new changes since I last saw her. She does see rheumatology for lupus. No seizures. Review of Systems Constitutional: Positive for malaise/fatigue. Neurological: Negative for seizures. All other systems reviewed and are negative. Past Medical History:  
Diagnosis Date  Asthma  Epilepsy ([facility] [address]  Lupus  Neurological disorder   
 seizure  Seizure ([facility] [address] Family History Problem Relation Age of Onset  No Known Problems Mother  No Known Problems Father Social History Socioeconomic History  Marital status:  Spouse name: Not on file  Number of children: Not on file  Years of education: Not on file  Highest education level: Not on file Social Needs  Financial resource strain: Not on file  Food insecurity - worry: Not on file  Food insecurity - inability: Not on file  Transportation needs - medical: Not on file  Transportation needs - non-medical: Not on file Occupational History  Not on file Tobacco Use  Smoking status: Never Smoker  Smokeless tobacco: Never Used Substance and Sexual Activity  Alcohol use: No  
 Drug use: No  
 Sexual activity: Yes  
  Partners: Male Birth control/protection: None Other Topics Concern  Not on file Social History Narrative  Not on file Allergies Allergen Reactions  Benadryl-D Allergy-Sinus [Diphenhydramine-Phenylephrine] Anxiety  Contrast Agent [Iodine] Other (comments) Stuffy  Nose unable to breath  Reglan [Metoclopramide] Other (comments) Current Outpatient Medications Medication Sig  
 ethosuximide (ZARONTIN) 250 mg/5 mL solution TAKE 20ML BY MOUTH ONCE DAILY AT NIGHT  hydroxychloroquine (PLAQUENIL) 200 mg tablet TAKE 1 AND 1/2 TABLET BY MOUTH EVERY DAY FOR 30 DAYS  azaTHIOprine (IMURAN) 50 mg tablet TAKE 3 TABLETS BY MOUTH EVERY DAY FOR 30 DAYS  
 QVAR REDIHALER 40 mcg/actuation HFAb inhaler INHALE 2 PUFFS PO Q 12 H  
 albuterol (PROVENTIL VENTOLIN) 2.5 mg /3 mL (0.083 %) nebulizer solution INHALE 1 VIAL VIA NEBULIZER Q 4 TO 6 H PRN  
 albuterol (PROAIR HFA) 90 mcg/actuation inhaler Take  by inhalation.  ergocalciferol (ERGOCALCIFEROL) 50,000 unit capsule TK ONE C PO  Q WEEK FOR 90 DAYS No current facility-administered medications for this visit. Neurologic Exam  
 
Mental Status WD/WN adult in NAD, normal grooming VSS 
A&O x 3 PERRL, nonicteric Face is symmetric, tongue midline Speech is fluent and clear No limb ataxia. No abnl movements. Moving all extemities spontaneously and symmetric Normal gait CVS RRR Lungs nonlabored Skin is warm and dry Visit Vitals /80 Pulse 70 Resp 16 Ht 5' 6\" (1.676 m) Wt 64 kg (141 lb) LMP 12/20/2018 (Approximate) SpO2 98% BMI 22.76 kg/m² Assessment and Plan Diagnoses and all orders for this visit: 1. Nonintractable absence epilepsy without status epilepticus ([facility] [address] Other orders 
-     ethosuximide (ZARONTIN) 250 mg/5 mL solution; TAKE 20ML BY MOUTH ONCE DAILY AT NIGHT 24-year-old woman with epilepsy currently well controlled with Zarontin. No changes. She had blood work done by rheumatology less than 2 months ago which I reviewed. No need to do blood work today. I would like to see her annually or sooner if needed. I reviewed and decided to continue the current medications. 2 [facility],  
NEUROLOGIST Diplomate RAMANA

## 2019-01-04 ENCOUNTER — TELEPHONE (OUTPATIENT)
Dept: NEUROLOGY | Age: 19
End: 2019-01-04

## 2019-01-04 NOTE — TELEPHONE ENCOUNTER
Re: Lindy Salgado    PA request received from pt's  Home	[place]. PA submitted via fax to [facility]. Fax included completed PA form and clinical notes 1/2/19, 11/27/17 and 4/12/17. Will update when determination is made.

## 2019-01-07 NOTE — TELEPHONE ENCOUNTER
Re: Zarontin    Approval received from [facility]. No additional information on the fax. Called and s/w Terrance Running @ [facility] to get approval info. Rao Marinelli:    Approved. Auth # [id number]. Auth good 12/20/18 - 12/20/19. Faxed approval to pt's [address]. Fax confirmation received 1/7/19.

## 2019-02-03 ENCOUNTER — ED HISTORICAL/CONVERTED ENCOUNTER (OUTPATIENT)
Dept: OTHER | Age: 19
End: 2019-02-03

## 2019-02-05 ENCOUNTER — IP HISTORICAL/CONVERTED ENCOUNTER (OUTPATIENT)
Dept: OTHER | Age: 19
End: 2019-02-05

## 2019-02-07 ENCOUNTER — TELEPHONE (OUTPATIENT)
Dept: RHEUMATOLOGY | Age: 19
End: 2019-02-07

## 2019-02-07 NOTE — TELEPHONE ENCOUNTER
Pt mom called and stated that the pt was seen in the hospital on Sunday pt was discharged, pt did not feel any better on Monday so pt returned back to the hospital and was admitted, pt mom stated that pt would like for Dr Brandi Álvarez to know that they have taken the pt off of all her meds that was prescribed by Dr Brandi Álvarez to see if that is where the pt abdominal pains are coming from, pt mom was informed that I will give her message to Dr Brandi Álvarez, pt mom verbally acknowledged understanding

## 2019-02-12 ENCOUNTER — TELEPHONE (OUTPATIENT)
Dept: RHEUMATOLOGY | Age: 19
End: 2019-02-12

## 2019-02-12 NOTE — TELEPHONE ENCOUNTER
----- Message from Kenna Hernandez sent at 2/8/2019  4:49 PM EST -----  Regarding: FW: Dr. Gale Posada: [phone]      ----- Message -----  From: Ronda Spanish: 2/8/2019   3:58 PM  To: [facility] Front Office Pool  Subject: Dr. James Marlow                              Pt's mom is requesting a call back because her daughter is still in the hospital and would like to speak with the him to see if he was able to get all of the paperwork [facility]?  Mom wanted to let the doctor know that she is still in pain and her stomach still hurts.

## 2019-02-12 NOTE — TELEPHONE ENCOUNTER
Spoke to pt mom informed pt mom per Dr Mitzy Costa that no paperwork has been received for review from [address] [place] [address], pt mom stated that it was to late pt was now at [address] [facility],[address], pt mom stated that pt may not make it and will be able to receive the transplant, pt mom was informed that the message will be relayed to Dr Mitzy Costa, pt mom verbally acknowledged understanding